# Patient Record
Sex: FEMALE | Race: BLACK OR AFRICAN AMERICAN | NOT HISPANIC OR LATINO | URBAN - METROPOLITAN AREA
[De-identification: names, ages, dates, MRNs, and addresses within clinical notes are randomized per-mention and may not be internally consistent; named-entity substitution may affect disease eponyms.]

---

## 2021-06-04 ENCOUNTER — OFFICE (OUTPATIENT)
Dept: URBAN - METROPOLITAN AREA CLINIC 34 | Facility: CLINIC | Age: 66
End: 2021-06-04

## 2021-06-04 VITALS
DIASTOLIC BLOOD PRESSURE: 61 MMHG | HEART RATE: 100 BPM | TEMPERATURE: 97.5 F | WEIGHT: 165.8 LBS | SYSTOLIC BLOOD PRESSURE: 144 MMHG | HEIGHT: 64 IN

## 2021-06-04 DIAGNOSIS — R10.13 EPIGASTRIC PAIN: ICD-10-CM

## 2021-06-04 DIAGNOSIS — D50.0 IRON DEFICIENCY ANEMIA SECONDARY TO BLOOD LOSS (CHRONIC): ICD-10-CM

## 2021-06-04 PROCEDURE — 99214 OFFICE O/P EST MOD 30 MIN: CPT | Performed by: PHYSICIAN ASSISTANT

## 2021-06-04 NOTE — SERVICEHPINOTES
Ms. Colvin is a 65 yr old female here to f/u to an ER visit yesterday for anemia. No recent hx of anemia per patient. She presented to the ER yesterday feeling mildly weak and SOB. She was found to be anemic with a hgb of 7.1 and a low MCV. She received 1 unit of PRBCs. She feels much better since she received the blood yesterday. She endorsed intermittent epigastric discomfort postprandially and taking Tums a few times per week. Epigastric discomfort present x months. Good appetite and no early satiety. No dysphagia. She has lost weight but her diet changed, she cut out a lot of soda. + constipation, described as a daily but incomplete BM. No blood in stool. No black stool. No prior colonoscopy or EGD.   She takes a 325 mg ASA often, almost daily. No ETOH use. Smokes 2-3 cigarettes per day. No known heart issues.